# Patient Record
Sex: FEMALE | URBAN - METROPOLITAN AREA
[De-identification: names, ages, dates, MRNs, and addresses within clinical notes are randomized per-mention and may not be internally consistent; named-entity substitution may affect disease eponyms.]

---

## 2023-12-01 ENCOUNTER — HOSPITAL ENCOUNTER (EMERGENCY)
Facility: MEDICAL CENTER | Age: 33
End: 2023-12-02
Attending: STUDENT IN AN ORGANIZED HEALTH CARE EDUCATION/TRAINING PROGRAM

## 2023-12-01 DIAGNOSIS — L73.9 FOLLICULITIS: ICD-10-CM

## 2023-12-01 DIAGNOSIS — F15.10 METHAMPHETAMINE USE (HCC): ICD-10-CM

## 2023-12-01 DIAGNOSIS — R46.2 BIZARRE BEHAVIOR: ICD-10-CM

## 2023-12-01 DIAGNOSIS — Z59.00 HOMELESSNESS: ICD-10-CM

## 2023-12-01 LAB
GLUCOSE BLD STRIP.AUTO-MCNC: 87 MG/DL (ref 65–99)
POC BREATHALIZER: 0 PERCENT (ref 0–0.01)

## 2023-12-01 PROCEDURE — 700102 HCHG RX REV CODE 250 W/ 637 OVERRIDE(OP): Performed by: STUDENT IN AN ORGANIZED HEALTH CARE EDUCATION/TRAINING PROGRAM

## 2023-12-01 PROCEDURE — 99284 EMERGENCY DEPT VISIT MOD MDM: CPT

## 2023-12-01 PROCEDURE — A9270 NON-COVERED ITEM OR SERVICE: HCPCS | Performed by: STUDENT IN AN ORGANIZED HEALTH CARE EDUCATION/TRAINING PROGRAM

## 2023-12-01 PROCEDURE — 82962 GLUCOSE BLOOD TEST: CPT

## 2023-12-01 PROCEDURE — 302970 POC BREATHALIZER: Performed by: STUDENT IN AN ORGANIZED HEALTH CARE EDUCATION/TRAINING PROGRAM

## 2023-12-01 RX ORDER — SULFAMETHOXAZOLE AND TRIMETHOPRIM 800; 160 MG/1; MG/1
1 TABLET ORAL ONCE
Status: COMPLETED | OUTPATIENT
Start: 2023-12-01 | End: 2023-12-01

## 2023-12-01 RX ORDER — DIAZEPAM 2 MG/1
2 TABLET ORAL ONCE
Status: COMPLETED | OUTPATIENT
Start: 2023-12-01 | End: 2023-12-01

## 2023-12-01 RX ORDER — SULFAMETHOXAZOLE AND TRIMETHOPRIM 800; 160 MG/1; MG/1
1 TABLET ORAL 2 TIMES DAILY
Qty: 14 TABLET | Refills: 0 | Status: ACTIVE | OUTPATIENT
Start: 2023-12-01 | End: 2023-12-08

## 2023-12-01 RX ADMIN — DIAZEPAM 2 MG: 2 TABLET ORAL at 22:34

## 2023-12-01 RX ADMIN — SULFAMETHOXAZOLE AND TRIMETHOPRIM 1 TABLET: 800; 160 TABLET ORAL at 22:34

## 2023-12-01 SDOH — ECONOMIC STABILITY - HOUSING INSECURITY: HOMELESSNESS UNSPECIFIED: Z59.00

## 2023-12-02 VITALS
WEIGHT: 117.73 LBS | RESPIRATION RATE: 18 BRPM | DIASTOLIC BLOOD PRESSURE: 55 MMHG | SYSTOLIC BLOOD PRESSURE: 97 MMHG | HEART RATE: 87 BPM | BODY MASS INDEX: 19.61 KG/M2 | TEMPERATURE: 97.2 F | OXYGEN SATURATION: 96 % | HEIGHT: 65 IN

## 2023-12-02 LAB
AMPHET UR QL SCN: POSITIVE
BARBITURATES UR QL SCN: NEGATIVE
BENZODIAZ UR QL SCN: NEGATIVE
BZE UR QL SCN: NEGATIVE
CANNABINOIDS UR QL SCN: POSITIVE
FENTANYL UR QL: NEGATIVE
HCG UR QL: NEGATIVE
METHADONE UR QL SCN: NEGATIVE
OPIATES UR QL SCN: NEGATIVE
OXYCODONE UR QL SCN: NEGATIVE
PCP UR QL SCN: NEGATIVE
PROPOXYPH UR QL SCN: NEGATIVE

## 2023-12-02 PROCEDURE — 81025 URINE PREGNANCY TEST: CPT

## 2023-12-02 PROCEDURE — 80307 DRUG TEST PRSMV CHEM ANLYZR: CPT

## 2023-12-02 NOTE — ED NOTES
Pt medicated per MAR, education provided, pt verbalized understanding.   FSBG checked ; 87. Given box lunch.   Breathalyzer done.   Pt restless, rambles.

## 2023-12-02 NOTE — ED NOTES
ERP at bedside.    improving with free water flushes 250 cc q4 and D5W infusion   - please continue free water repletion with D5W at 60 cc/hr    Will follow

## 2023-12-02 NOTE — ED TRIAGE NOTES
"Radha Gallegos  33 y.o.  Female    Chief Complaint   Patient presents with    Psych Eval     [place] reports [place] by staff and she began yelling and undressing. Staff called EMS. Pt denies SI/HI. On arrival pt reporting her feet have a \"poisonous touch to them.\" Pt reports meth use; states \"it is not an addiction or a deal breaker.\"      Pt calm and cooperative in triage. Pt w/ flight of ideas. Pt attempting to undress in triage, pt redirected and asked to keep clothes on.     Pt to triage for above compliant.     Pt placed in lobby and educated on triage process. Pt encouraged to alert staff for any changes, pt verbalized understanding.     BP 99/68   Pulse (!) 110   Temp 36.1 °C (97 °F) (Temporal)   Resp 18   Ht 1.651 m (5' 5\")   Wt 53.4 kg (117 lb 11.6 oz)   SpO2 96%   BMI 19.59 kg/m²     "

## 2023-12-02 NOTE — ED NOTES
Pt ambulated to and from bathroom with ED tech, able to provide urine sample.   Urine collected and sent.

## 2023-12-02 NOTE — ED PROVIDER NOTES
"ED Provider Note    CHIEF COMPLAINT  Chief Complaint   Patient presents with    Psych Eval     [place]. EMS reports pt was asked to leave [place] by staff and she began yelling and undressing. Staff called EMS. Pt denies SI/HI. On arrival pt reporting her feet have a \"poisonous touch to them.\" Pt reports meth use; states \"it is not an addiction or a deal breaker.\"        EXTERNAL RECORDS REVIEWED  Other no records available at the time of evaluation    HPI/ROS  LIMITATION TO HISTORY   Select: Behavior Krystal Dawwn is a 33 y.o. female who presents to the emergency department [place].  Patient reports that she was asked to leave the movie theater by  because she was scratching herself and attempting to take off of her clothing.  Patient reports that she last smoked spice \"20 days ago\".  She states that she feels like she has bugs crawling all over her.  She reports that she is from [place] and new to the area.  She has been staying at a group home.  She states she would like to return to [place] and plans to get a ride back there tomorrow.  She denies suicidal or homicidal ideation.  She states that after discharge she plans to return back to her group home and sleep.    PAST MEDICAL HISTORY       SURGICAL HISTORY  patient denies any surgical history    FAMILY HISTORY  History reviewed. No pertinent family history.    SOCIAL HISTORY  Social History     Tobacco Use    Smoking status: Some Days     Current packs/day: 0.50     Types: Cigarettes    Smokeless tobacco: Never   Vaping Use    Vaping Use: Every day    Substances: Nicotine    Devices: Disposable   Substance and Sexual Activity    Alcohol use: Yes     Comment: occ    Drug use: Yes     Types: Inhaled     Comment: meth    Sexual activity: Not on file       CURRENT MEDICATIONS  Home Medications       Reviewed by Eusebia Luna R.N. (Registered Nurse) on 12/01/23 at 1942  " "Med List Status: Not Addressed     Medication Last Dose Status        Patient Jerry Taking any Medications                           ALLERGIES  Not on File    PHYSICAL EXAM  VITAL SIGNS: BP 99/68   Pulse (!) 110   Temp 36.1 °C (97 °F) (Temporal)   Resp 18   Ht 1.651 m (5' 5\")   Wt 53.4 kg (117 lb 11.6 oz)   SpO2 96%   BMI 19.59 kg/m²    Constitutional: Hyperactive, scratching at self, sitting upside down on the gurney  HEENT: Atraumatic, normocephalic  Neck: Supple, normal range of motion  Cardiovascular: Tachycardic, regular  Thorax & Lungs: No respiratory distress, no wheezes, rales or rhonchi, no chest wall tenderness.  GI: Soft, non-distended, non-tender, no rebound  Skin: Warm, dry, scattered scabbed over lesions to the lower extremities with minimal surrounding erythema.  Scattered pustules overlying hair follicles.  Musculoskeletal: Moving all extremities, no acute deformity, no edema, no tenderness  Neurologic: A&Ox3, at baseline mentation, cranial nerves II through XII are grossly intact, no sensory deficit, no ataxia  Psychiatric: Hyperactive, somewhat tangential, no SI, no HI      DIAGNOSTIC STUDIES / PROCEDURES  LABS  Labs Reviewed   POC BREATHALIZER - Normal   URINE DRUG SCREEN   HCG QUALITATIVE UR   POCT GLUCOSE DEVICE RESULTS       COURSE & MEDICAL DECISION MAKING    ED Observation Status? Yes; I am placing the patient in to an observation status due to a diagnostic uncertainty as well as therapeutic intensity. Patient placed in observation status at 9:52 PM, 12/1/2023.     Observation plan is as follows: Observation pending sober reevaluation for any additional or ongoing symptoms and stabilization of her mentation.      INITIAL ASSESSMENT, COURSE AND PLAN  Care Narrative:     Patient arrives emergency department brought in by EMS for bizarre behavior in the community.  At the time of my evaluation patient hyperactive and somewhat tangential with report of methamphetamine use recently.  Is " mildly tachycardic which I suspect is secondary to sympathomimetic toxidrome.  Is otherwise demonstrating no evidence of grave disability, and head to toe trauma evaluation unremarkable.  Denies suicidal or homicidal ideation.  Suspect bizarre behavior in the setting of methamphetamine use.  Will treat with benzodiazepines and reassess.  At this point do not feel that legal hold initiation is warranted.  Additionally patient with evidence of folliculitis likely in the setting of skin picking.  Will treat with oral Bactrim and provided prescription for such.  Will obtain blood sugar.    Patient's blood sugar is normal.  Will plan for continued observation pending sober reevaluation.  Will sign the patient out to my oncoming colleague for reassessment.      ADDITIONAL PROBLEM LIST  Polysubstance abuse disorder    DISPOSITION AND DISCUSSIONS  I have discussed management of the patient with the following physicians and EULALIA's: Dr. Rodriguez, emergency medicine colleague    Escalation of care considered, and ultimately not performed:blood analysis and diagnostic imaging    Barriers to care at this time, including but not limited to: Patient does not have established PCP, Patient is homeless, and Patient lacks transportation .     Decision tools and prescription drugs considered including, but not limited to: Antibiotics Bactrim for treatment of purulent folliculitis .    FINAL DIAGNOSIS  1. Folliculitis    2. Methamphetamine use (HCC)    3. Bizarre behavior    4. Homelessness           Electronically signed by: Darell Barnett M.D., 12/1/2023 9:52 PM

## 2023-12-02 NOTE — DISCHARGE SUMMARY
"  ED Observation Discharge Summary    Patient:Radha Gallegos  Patient : 1990  Patient MRN: [id number]  Patient PCP: No primary care provider on file.    Admit Date: 2023  Discharge Date and Time: 23 5:40 AM  Discharge Diagnosis: Bizarre behavior  Discharge Attending: Efe Rodriguez M.D.  Discharge Service: ED Observation    ED Course  Radha is a 33 y.o. female who was evaluated at [facility] for bizarre behavior, methamphetamine use and suspected folliculitis, treated with Bactrim.  Patient is oriented at this time, tolerating oral intake, ambulating, Feels better.    Discharge Exam:  BP 97/55   Pulse 87   Temp 36.2 °C (97.2 °F) (Temporal)   Resp 18   Ht 1.651 m (5' 5\")   Wt 53.4 kg (117 lb 11.6 oz)   SpO2 96%   BMI 19.59 kg/m² .    Constitutional: Awake and alert. Nontoxic, disheveled, unkempt  HENT:  Grossly normal  Eyes: Grossly normal  Neck: Normal range of motion  Cardiovascular: Normal heart rate   Thorax & Lungs: No respiratory distress  Abdomen: Nontender  Skin:  No pathologic rash.   Extremities: Well perfused  Psychiatric: Affect normal    Labs  Results for orders placed or performed during the hospital encounter of 23   URINE DRUG SCREEN   Result Value Ref Range    Amphetamines Urine Positive (A) Negative    Barbiturates Negative Negative    Benzodiazepines Negative Negative    Cocaine Metabolite Negative Negative    Fentanyl, Urine Negative Negative    Methadone Negative Negative    Opiates Negative Negative    Oxycodone Negative Negative    Phencyclidine -Pcp Negative Negative    Propoxyphene Negative Negative    Cannabinoid Metab Positive (A) Negative   HCG QUALITATIVE UR (Lab)   Result Value Ref Range    Beta-Hcg Urine Negative Negative   POC BREATHALIZER   Result Value Ref Range    POC Breathalizer 0.000 0.00 - 0.01 Percent   POCT glucose device results   Result Value Ref Range    POC Glucose, Blood 87 65 - 99 mg/dL       Radiology  No orders " to display       Medications:   New Prescriptions    SULFAMETHOXAZOLE-TRIMETHOPRIM (BACTRIM DS) 800-160 MG TABLET    Take 1 Tablet by mouth 2 times a day for 7 days.       My final assessment includes labs, imaging, vital sign, documentation review.  Repeat examination.  Upon Reevaluation, the patient's condition has: Improved; and will be discharged.    Patient discharged from ED Observation status at 5:41 AM (Time) 12/2/2023 (Date).     Total time spent on this ED Observation discharge encounter is < 30 Minutes    Electronically signed by: Efe Rodriguez M.D., 12/2/2023 5:40 AM

## 2023-12-02 NOTE — ED NOTES
"Pt discharged home, pt A&Ox4, on room air, steady gait. pt in possession of belongings. Escorted out by security. Pt provided discharge education and information pertaining to medications and follow up appointments. Pt received copy of discharge instructions and verbalized understanding. BP 97/55   Pulse 87   Temp 36.2 °C (97.2 °F) (Temporal)   Resp 18   Ht 1.651 m (5' 5\")   Wt 53.4 kg (117 lb 11.6 oz)   SpO2 96%   BMI 19.59 kg/m²    "

## 2023-12-02 NOTE — DISCHARGE INSTRUCTIONS
Please take antibiotics as prescribed twice daily for the next 7 days for treatment of your skin infection.